# Patient Record
Sex: FEMALE | Race: WHITE | NOT HISPANIC OR LATINO | Employment: FULL TIME | URBAN - METROPOLITAN AREA
[De-identification: names, ages, dates, MRNs, and addresses within clinical notes are randomized per-mention and may not be internally consistent; named-entity substitution may affect disease eponyms.]

---

## 2021-05-13 ENCOUNTER — OFFICE VISIT (OUTPATIENT)
Dept: URGENT CARE | Facility: URGENT CARE | Age: 31
End: 2021-05-13
Payer: OTHER MISCELLANEOUS

## 2021-05-13 VITALS
HEART RATE: 103 BPM | SYSTOLIC BLOOD PRESSURE: 130 MMHG | WEIGHT: 127 LBS | TEMPERATURE: 98.2 F | OXYGEN SATURATION: 100 % | HEIGHT: 69 IN | BODY MASS INDEX: 18.81 KG/M2 | DIASTOLIC BLOOD PRESSURE: 89 MMHG | RESPIRATION RATE: 18 BRPM

## 2021-05-13 DIAGNOSIS — S61.214A LACERATION OF RIGHT RING FINGER W/O FOREIGN BODY W/O DAMAGE TO NAIL, INITIAL ENCOUNTER: ICD-10-CM

## 2021-05-13 DIAGNOSIS — S61.459A DOG BITE OF HAND, UNSPECIFIED LATERALITY, INITIAL ENCOUNTER: Primary | ICD-10-CM
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
## 2021-05-13 DIAGNOSIS — S61.449A: ICD-10-CM

## 2021-05-13 PROCEDURE — 90715 TDAP VACCINE 7 YRS/> IM: CPT | Performed by: NURSE PRACTITIONER

## 2021-05-13 PROCEDURE — 99203 OFFICE O/P NEW LOW 30 MIN: CPT | Mod: 25 | Performed by: NURSE PRACTITIONER

## 2021-05-13 PROCEDURE — 12002 RPR S/N/AX/GEN/TRNK2.6-7.5CM: CPT | Performed by: NURSE PRACTITIONER

## 2021-05-13 PROCEDURE — 90471 IMMUNIZATION ADMIN: CPT | Performed by: NURSE PRACTITIONER

## 2021-05-13 RX ORDER — CLINDAMYCIN HCL 300 MG
300 CAPSULE ORAL 3 TIMES DAILY
Qty: 21 CAPSULE | Refills: 0 | Status: SHIPPED | OUTPATIENT
Start: 2021-05-13 | End: 2021-05-20

## 2021-05-13 ASSESSMENT — MIFFLIN-ST. JEOR: SCORE: 1350.45

## 2021-05-13 NOTE — PATIENT INSTRUCTIONS
Acetaminophen (Tylenol) may be taken up to 4000mg daily (3000mg if over 65) which would be 2 regular strength tablets (325mg) or two extra strength tablets(500mg) up to 4 times a day (3 times a day if over 65).   Check for acetaminophen in other OTC or prescription medications and be sure you add this in the maximum amount you take every day.    Too much acetaminophen can lead to serious liver damage. DO NOT TAKE if you have a history of liver disease.    Ibuprofen 200mg tablets may be taken up to 4 pills 4 times a day(three times a day if over 65)  to the maximum of 3200mg,  (2400mg if >65)  daily as needed for pain.   Take with food.  Don't take with aspirin, Aleve or other antiinflammatory medication or with warfarin. DO NOT TAKE if you have a history of kidney disease.    Ice and elevate the hands. No submerging hands in water until stitches are out.    Patient Education     Dog Bite  A dog bite can cause a wound deep enough to break the skin. In such cases, the wound is cleaned and sometimes closed. Wounds would be closed if they are gaping open or in cosmetically important areas such as the face. If the wound is closed, it is usually not completely closed. This is so that fluid can drain if the wound becomes infected. Often, wounds will be left open to heal. In addition to wound care, a tetanus shot (injection) may be given, if needed.     Home care    Wash your hands well with soap and warm water before and after caring for the wound. This helps lower the risk of infection.    Care for the wound as directed. If a dressing was applied to the wound, be sure to change it as directed.    If the wound bleeds, place a clean, soft cloth on the wound. Then firmly apply pressure until the bleeding stops. This may take up to 5 minutes. Don't release the pressure and look at the wound during this time.    Most wounds heal within 10 days. But an infection can occur even with correct treatment. So be sure to check the  wound daily for signs of infection (see below).    Antibiotics may be prescribed. These help prevent or treat infection. If you re given antibiotics, take them as directed. Also be sure to complete the medicines.  Rabies prevention  Rabies is a virus that can be carried in certain animals. These can include domestic animals such as dogs and cats. Pets fully vaccinated against rabies (2 shots) are at very low risk of infection. But because human rabies is almost always fatal, any biting pet should be confined for 10 days as an extra precaution. In general, if there is a risk for rabies, the following steps may need to be taken:     If someone s pet dog has bitten you, it should be kept in a secure area for the next 10 days to watch for signs of illness. (If the pet owner won t allow this, contact your local animal control center.) Ask to see the pet's vaccination history records. If the dog becomes ill or dies during that time, contact your local animal control center at once so the animal may be tested for rabies. If the dog stays healthy for the next 10 days, there is no danger of rabies in the animal or you.  ? If a stray dog bit you, contact your local animal control center. They can give information on capture, quarantine, and animal rabies testing.  ? If you can t find the animal that bit you in the next 2 days, and if rabies exists in your area, you may need to receive the rabies vaccine series. Call your healthcare provider right away. Or return to the emergency department promptly.  Follow-up care  Follow up with your healthcare provider, or as directed.  When to get medical advice  Call your healthcare provider or get medical attention right away if any of these occur:     Signs of infection:  ? Spreading redness or warmth from the wound  ? Increased pain or swelling  ? Fever of 100.4 F (38 C) or higher, or as directed by your healthcare provider  ? Colored fluid or pus draining from the wound    Signs of  rabies infection. Don't wait for any of these symptoms to occur! If you suspect that the dog that bit you is rabid, or if the dog is lost and can't be found, you should get the vaccine series.  ? Headache  ? Confusion  ? Strange behavior  ? Increased salivating and drooling  ? Seizure  ? Hallucination, anxiety, or agitation  ? Fever    Decreased ability to move any body part near the wound    Bleeding that can't be stopped after 5 minutes of firm pressure  [employer] last reviewed this educational content on 8/1/2019 2000-2020 [employer]. [address] [place], [place] [id number]. All rights reserved. This information is not intended as a substitute for professional medical care. Always follow your healthcare professional's instructions.  This information has been modified by your health care provider with permission from the publisher.           Patient Education     Laceration of an Arm or Leg: Stitches, Staples, or Tape   A laceration is a cut through the skin. If it's deep or it's gaping open, it may require stitches or staples to close so it can heal. Minor cuts may be treated with surgical tape closures, or skin glue.   X-rays may be done if something may have entered the skin through the cut. You may also need a tetanus shot if you are not up to date on this vaccine.   Home care    Follow the healthcare provider s instructions on how to care for the cut.    Wash your hands with soap and clean, running water before and after caring for your wound. This is to help prevent infection.    Keep the wound clean and dry. If a bandage was applied and it becomes wet or dirty, replace it. Otherwise, leave it in place for the first 24 hours, then change it once a day or as directed.    If stitches or staples were used, clean the wound daily:  ? After removing the bandage, wash the area with soap and water. Use a wet cotton swab to loosen and remove any blood or crust that forms.  ? After cleaning, keep  the wound clean and dry. Talk with your healthcare provider before putting any antibiotic ointment on the wound. Reapply the bandage.    Remove the bandage to shower as usual after the first 24 hours, but don't soak the area in water (no swimming) until the stitches or staples are removed.    If surgical tape closures were used, keep the area clean and dry. If it becomes wet, blot it dry with a towel. Let the surgical tape fall off on its own.    Follow the healthcare provider's instructions for any medicines prescribed.  ? The provider may prescribe an antibiotic cream or ointment to prevent infection. He or she may also prescribe an antibiotic pill. Don't stop taking this medicine until you have finished it all or the provider tells you to stop.  ? The provider may also prescribe medicine for pain. Follow the instructions exactly for how to take these medicines.    Don't do activities that may reopen your wound.    Follow-up care  Follow up with your healthcare provider, or as advised. Most skin wounds heal within 10 days. But an infection may sometimes occur even with proper treatment. Check the wound daily for the signs of infection listed below. Stitches and staples should be removed within 7 to14 days. If surgical tape closures were used, you may remove them after 10 days if they have not fallen off by then.    When to seek medical advice  Call your healthcare provider right away if any of these occur:    Wound bleeding not controlled by direct pressure    Signs of infection, including increasing pain in the wound, increasing wound redness or swelling, or pus or bad odor coming from the wound    Chills, fever of 100.4 F (38 C) or higher, or as directed by your healthcare provider    Stitches or staples coming apart or falling out or surgical tape falling off before 7 days    Wound edges reopening    Color changes in the wound    Numbness around the wound     Decreased movement around the injured area  [employer]  last reviewed this educational content on 6/1/2020 2000-2021 [employer]. All rights reserved. This information is not intended as a substitute for professional medical care. Always follow your healthcare professional's instructions.           Patient Education     Laceration: How to Minimize Scarring  A laceration is a cut through one or more layers of the skin. Cuts heal as the edges of the cut grow together and heal. After the cut heals, the skin may not look exactly the same. The tiburcio left behind is called a scar. Scars are a natural part of the healing process. They are hard to avoid. How much you may scar depends on the depth of the cut, its location on your body, your age, and how your skin heals. Some people tend to heal with more scarring than others. Most scars fade and become less noticeable over time. You can take steps to help this process along.  NOTE: Please inform the staff of your last tetanus shot and if your wound was caused by a nam or dirty object.  What you can do  The tips below can help reduce scarring as your wound heals.    Keep the wound clean. Unless you are told to keep the area dry, gently wash the area with mild soap and water. You don't need to use antibacterial soap.     Keep the wound moist. Apply petroleum jelly to the wound to keep it moist and prevent a scab from forming. Scabs lengthen the time it takes a wound to heal.    Cover the wound. Use a non-adhesive bandage or gauze pad with paper tape. Change the bandage daily or if it gets wet or dirty.    Try hydrating or silicone gel sheets. These dressings keep the wound moist and may help it heal faster with less scarring. They may be useful for larger wounds, scrapes, sores, burns, or wounds with persistent redness. Ask your healthcare provider whether you should use this product on your wound.    If you have stitches (sutures), follow your healthcare provider's instructions. Care for the wound as instructed.  Also, return to have stitches removed on time. If you wait, scarring might be worse.    Use sunscreen. Once the wound heals, apply sunscreen to the area daily. Sun may cause the scar to be more visible and discolored.    Once the wound heals, there is some evidence that over-the-counter scar creams can reduce the appearance of a scar.  Follow up  Make a follow-up appointment as directed by our staff. If you are advised to see a specialist or you have concerns about scarring, please contact a dermatologist.  When to seek medical advice  When to seek medical advice    Call your health care provider right away if any of these occur:    Shaking chills or fever above 100.4 F (38 C)    Bleeding that soaks the dressing    Pink fluid weeping from the wound    Increased drainage from the wound or drainage that is yellow, yellow-green, or has a foul odor    Increased swelling, pain, or redness in the skin around the wound    A change in the color or size of the wound    Increased fatigue    Loss of appetite    Sutures pulling away from the wound or pulling apart  [employer] last reviewed this educational content on 5/1/2018 2000-2021 [employer]. All rights reserved. This information is not intended as a substitute for professional medical care. Always follow your healthcare professional's instructions.

## 2021-05-13 NOTE — PROGRESS NOTES
Prior to immunization administration, verified patients identity using patient s name and date of birth. Please see Immunization Activity for additional information.     Screening Questionnaire for Adult Immunization    Are you sick today?   No   Do you have allergies to medications, food, a vaccine component or latex?   No   Have you ever had a serious reaction after receiving a vaccination?   No   Do you have a long-term health problem with heart, lung, kidney, or metabolic disease (e.g., diabetes), asthma, a blood disorder, no spleen, complement component deficiency, a cochlear implant, or a spinal fluid leak?  Are you on long-term aspirin therapy? no   Do you have cancer, leukemia, HIV/AIDS, or any other immune system problem?   No   Do you have a parent, brother, or sister with an immune system problem?   No   In the past 3 months, have you taken medications that affect  your immune system, such as prednisone, other steroids, or anticancer drugs; drugs for the treatment of rheumatoid arthritis, Crohn s disease, or psoriasis; or have you had radiation treatments?   No   Have you had a seizure, or a brain or other nervous system problem?   No   During the past year, have you received a transfusion of blood or blood    products, or been given immune (gamma) globulin or antiviral drug?   No   For women: Are you pregnant or is there a chance you could become       pregnant during the next month?   No   Have you received any vaccinations in the past 4 weeks?   No     Immunization questionnaire answers were all negative.        Per orders of Lori Gill CNP, injection of TDAP  given by Donovan Bhatia CMA. Patient instructed to remain in clinic for 15 minutes afterwards, and to report any adverse reaction to me immediately.       Screening performed by Donovan Bhatia CMA on 5/13/2021 at 1:02 PM.    Clinic Administered Medication Documentation      Injectable Medication Documentation    Patient was given TDAP . Prior to medication  administration, verified patients identity using patient s name and date of birth. Please see MAR and medication order for additional information. Patient instructed to remain in clinic for 15 minutes.      Was entire vial of medication used? Yes  Vial/Syringe: Single dose vial  Expiration Date:  11/06/2022  Was this medication supplied by the patient? No

## 2021-05-13 NOTE — PROGRESS NOTES
"Chief Complaint   Patient presents with     Urgent Care     Consult      dog bite last TDAP 1996         ICD-10-CM    1. Dog bite of hand, unspecified laterality, initial encounter  S61.459A clindamycin (CLEOCIN) 300 MG capsule    W54.0XXA    2. Laceration of right ring finger w/o foreign body w/o damage to nail, initial encounter  S61.214A clindamycin (CLEOCIN) 300 MG capsule   3. Puncture wound of hand with foreign body, unspecified laterality, initial encounter  S61.449A clindamycin (CLEOCIN) 300 MG capsule   Patient will monitor punctures for development of infection, take antibiotics prophylactically and return in 7 days for suture removal.    Medical Decision Making    Very high risk of infection with this wound, multiple punctures requiring additional care and treatment.    Subjective     Sole Ball is an 31 year old female who presents to clinic today for dog bite to both hands. She was at work at a dog . Dog was up to date on immunizations.  She washed her hands thoroughly after incident occurred but presents now for evaluation.    ROS: 10 point ROS neg other than the symptoms noted above in the HPI.       Objective    /89   Pulse 103   Temp 98.2  F (36.8  C)   Resp 18   Ht 1.753 m (5' 9\")   Wt 57.6 kg (127 lb)   SpO2 100%   BMI 18.75 kg/m      Physical Exam       GENERAL APPEARANCE: healthy appearing, alert     MS: extremities normal- no gross deformities noted; normal muscle tone, full range of motion of all fingers on both hands     SKIN: Multiple punctures to the left fingers with ecchymosis and edema present, multiple punctures to right fingers with a 2.8 cm laceration to the ring finger, all neurovascular structures are intact     NEURO: Normal strength and tone, mentation intact and speech normal, normal sensation to fingers    All risks and benefits of laceration repair were discussed with patient and she consented to proceed.  Patient's wounds were cleansed with " Hibiclens, irrigated with normal saline and right ring finger laceration was anesthetized with 1% lidocaine.  Wound was sutured by this provider with 4.0 Ethilon sutures, bacitracin was applied to all wounds and they were bandaged.    Patient Instructions   Acetaminophen (Tylenol) may be taken up to 4000mg daily (3000mg if over 65) which would be 2 regular strength tablets (325mg) or two extra strength tablets(500mg) up to 4 times a day (3 times a day if over 65).   Check for acetaminophen in other OTC or prescription medications and be sure you add this in the maximum amount you take every day.    Too much acetaminophen can lead to serious liver damage. DO NOT TAKE if you have a history of liver disease.    Ibuprofen 200mg tablets may be taken up to 4 pills 4 times a day(three times a day if over 65)  to the maximum of 3200mg,  (2400mg if >65)  daily as needed for pain.   Take with food.  Don't take with aspirin, Aleve or other antiinflammatory medication or with warfarin. DO NOT TAKE if you have a history of kidney disease.    Ice and elevate the hands. No submerging hands in water until stitches are out.    Patient Education     Dog Bite  A dog bite can cause a wound deep enough to break the skin. In such cases, the wound is cleaned and sometimes closed. Wounds would be closed if they are gaping open or in cosmetically important areas such as the face. If the wound is closed, it is usually not completely closed. This is so that fluid can drain if the wound becomes infected. Often, wounds will be left open to heal. In addition to wound care, a tetanus shot (injection) may be given, if needed.     Home care    Wash your hands well with soap and warm water before and after caring for the wound. This helps lower the risk of infection.    Care for the wound as directed. If a dressing was applied to the wound, be sure to change it as directed.    If the wound bleeds, place a clean, soft cloth on the wound. Then firmly  apply pressure until the bleeding stops. This may take up to 5 minutes. Don't release the pressure and look at the wound during this time.    Most wounds heal within 10 days. But an infection can occur even with correct treatment. So be sure to check the wound daily for signs of infection (see below).    Antibiotics may be prescribed. These help prevent or treat infection. If you re given antibiotics, take them as directed. Also be sure to complete the medicines.  Rabies prevention  Rabies is a virus that can be carried in certain animals. These can include domestic animals such as dogs and cats. Pets fully vaccinated against rabies (2 shots) are at very low risk of infection. But because human rabies is almost always fatal, any biting pet should be confined for 10 days as an extra precaution. In general, if there is a risk for rabies, the following steps may need to be taken:     If someone s pet dog has bitten you, it should be kept in a secure area for the next 10 days to watch for signs of illness. (If the pet owner won t allow this, contact your local animal control center.) Ask to see the pet's vaccination history records. If the dog becomes ill or dies during that time, contact your local animal control center at once so the animal may be tested for rabies. If the dog stays healthy for the next 10 days, there is no danger of rabies in the animal or you.  ? If a stray dog bit you, contact your local animal control center. They can give information on capture, quarantine, and animal rabies testing.  ? If you can t find the animal that bit you in the next 2 days, and if rabies exists in your area, you may need to receive the rabies vaccine series. Call your healthcare provider right away. Or return to the emergency department promptly.  Follow-up care  Follow up with your healthcare provider, or as directed.  When to get medical advice  Call your healthcare provider or get medical attention right away if any of  these occur:     Signs of infection:  ? Spreading redness or warmth from the wound  ? Increased pain or swelling  ? Fever of 100.4 F (38 C) or higher, or as directed by your healthcare provider  ? Colored fluid or pus draining from the wound    Signs of rabies infection. Don't wait for any of these symptoms to occur! If you suspect that the dog that bit you is rabid, or if the dog is lost and can't be found, you should get the vaccine series.  ? Headache  ? Confusion  ? Strange behavior  ? Increased salivating and drooling  ? Seizure  ? Hallucination, anxiety, or agitation  ? Fever    Decreased ability to move any body part near the wound    Bleeding that can't be stopped after 5 minutes of firm pressure  [employer] last reviewed this educational content on 8/1/2019 2000-2020 [email or web address]. [address] [place] [address]. All rights reserved. This information is not intended as a substitute for professional medical care. Always follow your healthcare professional's instructions.  This information has been modified by your health care provider with permission from the publisher.           Patient Education     Laceration of an Arm or Leg: Stitches, Staples, or Tape   A laceration is a cut through the skin. If it's deep or it's gaping open, it may require stitches or staples to close so it can heal. Minor cuts may be treated with surgical tape closures, or skin glue.   X-rays may be done if something may have entered the skin through the cut. You may also need a tetanus shot if you are not up to date on this vaccine.   Home care    Follow the healthcare provider s instructions on how to care for the cut.    Wash your hands with soap and clean, running water before and after caring for your wound. This is to help prevent infection.    Keep the wound clean and dry. If a bandage was applied and it becomes wet or dirty, replace it. Otherwise, leave it in place for the first 24 hours, then change it  once a day or as directed.    If stitches or staples were used, clean the wound daily:  ? After removing the bandage, wash the area with soap and water. Use a wet cotton swab to loosen and remove any blood or crust that forms.  ? After cleaning, keep the wound clean and dry. Talk with your healthcare provider before putting any antibiotic ointment on the wound. Reapply the bandage.    Remove the bandage to shower as usual after the first 24 hours, but don't soak the area in water (no swimming) until the stitches or staples are removed.    If surgical tape closures were used, keep the area clean and dry. If it becomes wet, blot it dry with a towel. Let the surgical tape fall off on its own.    Follow the healthcare provider's instructions for any medicines prescribed.  ? The provider may prescribe an antibiotic cream or ointment to prevent infection. He or she may also prescribe an antibiotic pill. Don't stop taking this medicine until you have finished it all or the provider tells you to stop.  ? The provider may also prescribe medicine for pain. Follow the instructions exactly for how to take these medicines.    Don't do activities that may reopen your wound.    Follow-up care  Follow up with your healthcare provider, or as advised. Most skin wounds heal within 10 days. But an infection may sometimes occur even with proper treatment. Check the wound daily for the signs of infection listed below. Stitches and staples should be removed within 7 to14 days. If surgical tape closures were used, you may remove them after 10 days if they have not fallen off by then.    When to seek medical advice  Call your healthcare provider right away if any of these occur:    Wound bleeding not controlled by direct pressure    Signs of infection, including increasing pain in the wound, increasing wound redness or swelling, or pus or bad odor coming from the wound    Chills, fever of 100.4 F (38 C) or higher, or as directed by your  healthcare provider    Stitches or staples coming apart or falling out or surgical tape falling off before 7 days    Wound edges reopening    Color changes in the wound    Numbness around the wound     Decreased movement around the injured area  [employer] last reviewed this educational content on 6/1/2020 2000-2021 [employer]. All rights reserved. This information is not intended as a substitute for professional medical care. Always follow your healthcare professional's instructions.           Patient Education     Laceration: How to Minimize Scarring  A laceration is a cut through one or more layers of the skin. Cuts heal as the edges of the cut grow together and heal. After the cut heals, the skin may not look exactly the same. The tiburcio left behind is called a scar. Scars are a natural part of the healing process. They are hard to avoid. How much you may scar depends on the depth of the cut, its location on your body, your age, and how your skin heals. Some people tend to heal with more scarring than others. Most scars fade and become less noticeable over time. You can take steps to help this process along.  NOTE: Please inform the staff of your last tetanus shot and if your wound was caused by a nam or dirty object.  What you can do  The tips below can help reduce scarring as your wound heals.    Keep the wound clean. Unless you are told to keep the area dry, gently wash the area with mild soap and water. You don't need to use antibacterial soap.     Keep the wound moist. Apply petroleum jelly to the wound to keep it moist and prevent a scab from forming. Scabs lengthen the time it takes a wound to heal.    Cover the wound. Use a non-adhesive bandage or gauze pad with paper tape. Change the bandage daily or if it gets wet or dirty.    Try hydrating or silicone gel sheets. These dressings keep the wound moist and may help it heal faster with less scarring. They may be useful for larger wounds,  scrapes, sores, burns, or wounds with persistent redness. Ask your healthcare provider whether you should use this product on your wound.    If you have stitches (sutures), follow your healthcare provider's instructions. Care for the wound as instructed. Also, return to have stitches removed on time. If you wait, scarring might be worse.    Use sunscreen. Once the wound heals, apply sunscreen to the area daily. Sun may cause the scar to be more visible and discolored.    Once the wound heals, there is some evidence that over-the-counter scar creams can reduce the appearance of a scar.  Follow up  Make a follow-up appointment as directed by our staff. If you are advised to see a specialist or you have concerns about scarring, please contact a dermatologist.  When to seek medical advice  When to seek medical advice    Call your health care provider right away if any of these occur:    Shaking chills or fever above 100.4 F (38 C)    Bleeding that soaks the dressing    Pink fluid weeping from the wound    Increased drainage from the wound or drainage that is yellow, yellow-green, or has a foul odor    Increased swelling, pain, or redness in the skin around the wound    A change in the color or size of the wound    Increased fatigue    Loss of appetite    Sutures pulling away from the wound or pulling apart  [employer] last reviewed this educational content on 5/1/2018 2000-2021 [employer]. All rights reserved. This information is not intended as a substitute for professional medical care. Always follow your healthcare professional's instructions.               GLORIA Rueda, CNP  [facility] Provider